# Patient Record
Sex: FEMALE | Race: WHITE | NOT HISPANIC OR LATINO | URBAN - METROPOLITAN AREA
[De-identification: names, ages, dates, MRNs, and addresses within clinical notes are randomized per-mention and may not be internally consistent; named-entity substitution may affect disease eponyms.]

---

## 2024-04-15 ENCOUNTER — OFFICE VISIT (OUTPATIENT)
Dept: FAMILY MEDICINE CLINIC | Facility: CLINIC | Age: 29
End: 2024-04-15
Payer: COMMERCIAL

## 2024-04-15 VITALS
BODY MASS INDEX: 26.61 KG/M2 | DIASTOLIC BLOOD PRESSURE: 72 MMHG | RESPIRATION RATE: 16 BRPM | OXYGEN SATURATION: 99 % | HEART RATE: 87 BPM | WEIGHT: 165.6 LBS | SYSTOLIC BLOOD PRESSURE: 118 MMHG | HEIGHT: 66 IN | TEMPERATURE: 97.7 F

## 2024-04-15 DIAGNOSIS — Z80.3 FAMILY HISTORY OF BREAST CANCER: ICD-10-CM

## 2024-04-15 DIAGNOSIS — N60.01 BENIGN BREAST CYST IN FEMALE, RIGHT: ICD-10-CM

## 2024-04-15 DIAGNOSIS — Z13.228 SCREENING FOR ENDOCRINE, METABOLIC AND IMMUNITY DISORDER: ICD-10-CM

## 2024-04-15 DIAGNOSIS — Z13.1 SCREENING FOR DIABETES MELLITUS: ICD-10-CM

## 2024-04-15 DIAGNOSIS — Z13.29 SCREENING FOR ENDOCRINE, METABOLIC AND IMMUNITY DISORDER: ICD-10-CM

## 2024-04-15 DIAGNOSIS — Z13.6 SCREENING FOR CARDIOVASCULAR CONDITION: Primary | ICD-10-CM

## 2024-04-15 DIAGNOSIS — R92.30 DENSE BREAST TISSUE ON MAMMOGRAM, UNSPECIFIED TYPE: ICD-10-CM

## 2024-04-15 DIAGNOSIS — Z13.0 SCREENING FOR ENDOCRINE, METABOLIC AND IMMUNITY DISORDER: ICD-10-CM

## 2024-04-15 DIAGNOSIS — F41.9 ANXIETY: ICD-10-CM

## 2024-04-15 PROCEDURE — 99204 OFFICE O/P NEW MOD 45 MIN: CPT | Performed by: FAMILY MEDICINE

## 2024-04-15 NOTE — PROGRESS NOTES
Assessment/Plan:  Problem List Items Addressed This Visit        Behavioral Health    Anxiety     The patient is doing more behavioral things to help control her anxiety such as exercise.  She will continue to monitor at this point.  She will consider possibly establishing with a therapist at some point.            Oncology    Family history of breast cancer    Relevant Orders    Ambulatory Referral to Oncology Genetics    Ambulatory Referral to Obstetrics / Gynecology       Obstetrics/Gynecology    Benign breast cyst in female, right    Relevant Orders    Ambulatory Referral to Oncology Genetics    Ambulatory Referral to Obstetrics / Gynecology    Dense breast tissue on mammogram    Relevant Orders    Ambulatory Referral to Oncology Genetics    Ambulatory Referral to Obstetrics / Gynecology   Other Visit Diagnoses     Screening for cardiovascular condition    -  Primary    Relevant Orders    CBC and differential    Comprehensive metabolic panel    LDL cholesterol, direct    Lipid panel    TSH, 3rd generation with Free T4 reflex    Screening for diabetes mellitus        Relevant Orders    CBC and differential    Comprehensive metabolic panel    LDL cholesterol, direct    Lipid panel    TSH, 3rd generation with Free T4 reflex    Screening for endocrine, metabolic and immunity disorder        Relevant Orders    CBC and differential    Comprehensive metabolic panel    LDL cholesterol, direct    Lipid panel    TSH, 3rd generation with Free T4 reflex    UA w Reflex to Microscopic w Reflex to Culture      The patient has a very strong family history of breast cancer in a first-degree relative.  We will refer her for oncological genetic counseling as indicated to further assess her risk and will also help establish her with OB/GYN for routine gynecologic care.  She will go for the up-to-date lab work as ordered.  She will continue with her healthy diet and exercise.  We will see her back as scheduled.      Return in about  6 months (around 10/15/2024) for Annual physical.   I spent 15 minutes during the visit reviewing the history from the patient, performing the examination, discussing the findings with the patient, providing counseling and education, and making a plan. I spent 15 minutes ordering referrals and testing and documenting.    Subjective:   Chief Complaint   Patient presents with   • Establish Care     Complete Physical        Patient ID: Sandra Van is a 29 y.o. female presents today for an evaluation to establish care.  Sandra Van is a 29 y.o. female who presents today for new patient evaluation to establish care.  She is feeling well.  The patient denies any chest pain, shortness of breath, or palpitations.  There is no edema.  There are no headaches or visual changes.  There is no lightheadedness, dizziness, or fainting spells.  The patient currently denies any nausea, vomiting, or GERD symptoms.  she has normal bowel movements and normal urine output.  she has a normal appetite.  She needs a new GYN.   She has had a right breast cyst- She had US- at [facility].  She was told to have US every 6 months.  She is - she is looking to start a family.    She is concerned about her there is strong family history of breast cancer in first-degree relative.  Her mom  of breast cancer at age 51 that was very aggressive and metastatic.  She was diagnosed at age 45.          The following portions of the patient's history were reviewed and updated as appropriate: allergies, current medications, past family history, past medical history, past social history, past surgical history and problem list.  Patient Active Problem List   Diagnosis   • Family history of breast cancer   • Anxiety   • Dense breast tissue on mammogram   • Benign breast cyst in female, right       Past Medical History:   Diagnosis Date   • COVID-19      History reviewed. No pertinent surgical history.  Allergies   Allergen Reactions   •  Albuterol Hives, Palpitations, Shortness Of Breath and Tachycardia     Category: Adverse Reaction;   • Metoclopramide Hives, Hyperactivity and Itching     Throat started closing     Family History   Problem Relation Age of Onset   • Breast cancer Mother 45        Metastatic Disease-  at age 51.   • Hypertrophic cardiomyopathy Father    • Prostate cancer Father      Social History     Socioeconomic History   • Marital status: /Civil Union     Spouse name: Not on file   • Number of children: Not on file   • Years of education: Not on file   • Highest education level: Not on file   Occupational History   • Not on file   Tobacco Use   • Smoking status: Never   • Smokeless tobacco: Never   Vaping Use   • Vaping status: Never Used   Substance and Sexual Activity   • Alcohol use: Not Currently   • Drug use: Not Currently   • Sexual activity: Not on file   Other Topics Concern   • Not on file   Social History Narrative   • Not on file     Social Determinants of Health     Financial Resource Strain: Not on file   Food Insecurity: Not on file   Transportation Needs: Not on file   Physical Activity: Not on file   Stress: Not on file   Social Connections: Not on file   Intimate Partner Violence: Not on file   Housing Stability: Not on file     No current outpatient medications on file prior to visit.     No current facility-administered medications on file prior to visit.     Review of Systems   Constitutional: Negative.    HENT: Negative.     Eyes: Negative.    Respiratory: Negative.     Cardiovascular: Negative.    Gastrointestinal: Negative.    Endocrine: Negative.    Genitourinary: Negative.    Musculoskeletal: Negative.    Skin: Negative.    Allergic/Immunologic: Negative.    Neurological: Negative.    Hematological: Negative.    Psychiatric/Behavioral: Negative.         Objective:  Vitals:    04/15/24 0848   BP: 118/72   BP Location: Left arm   Patient Position: Sitting   Cuff Size: Large   Pulse: 87   Resp: 16  "  Temp: 97.7 °F (36.5 °C)   TempSrc: Tympanic   SpO2: 99%   Weight: 75.1 kg (165 lb 9.6 oz)   Height: 5' 5.6\" (1.666 m)     Body mass index is 27.06 kg/m².     Physical Exam  Constitutional:       Appearance: She is well-developed.   HENT:      Head: Normocephalic and atraumatic.      Right Ear: Tympanic membrane, ear canal and external ear normal.      Left Ear: Tympanic membrane, ear canal and external ear normal.      Nose: Nose normal.      Mouth/Throat:      Mouth: Mucous membranes are moist.      Pharynx: No oropharyngeal exudate.   Eyes:      Extraocular Movements: Extraocular movements intact.      Conjunctiva/sclera: Conjunctivae normal.      Pupils: Pupils are equal, round, and reactive to light.   Neck:      Thyroid: No thyromegaly.      Vascular: No JVD.      Trachea: No tracheal deviation.   Cardiovascular:      Rate and Rhythm: Normal rate and regular rhythm.      Pulses: Normal pulses.      Heart sounds: Normal heart sounds. No murmur heard.     No friction rub. No gallop.   Pulmonary:      Effort: Pulmonary effort is normal. No respiratory distress.      Breath sounds: Normal breath sounds. No stridor. No wheezing or rales.   Chest:      Chest wall: No tenderness.   Abdominal:      General: Bowel sounds are normal. There is no distension.      Palpations: Abdomen is soft. There is no mass.      Tenderness: There is no abdominal tenderness. There is no guarding or rebound.   Musculoskeletal:         General: No swelling, tenderness, deformity or signs of injury. Normal range of motion.      Cervical back: Normal range of motion.      Right lower leg: No edema.      Left lower leg: No edema.   Lymphadenopathy:      Cervical: No cervical adenopathy.   Skin:     General: Skin is warm and dry.   Neurological:      General: No focal deficit present.      Mental Status: She is alert and oriented to person, place, and time.      Cranial Nerves: No cranial nerve deficit.      Motor: No abnormal muscle tone. "      Coordination: Coordination normal.      Deep Tendon Reflexes: Reflexes are normal and symmetric. Reflexes normal.             Depression Screening and Follow-up Plan: Patient was screened for depression during today's encounter. They screened negative with a PHQ-2 score of 1.

## 2024-04-15 NOTE — ASSESSMENT & PLAN NOTE
The patient is doing more behavioral things to help control her anxiety such as exercise.  She will continue to monitor at this point.  She will consider possibly establishing with a therapist at some point.

## 2024-04-16 ENCOUNTER — TELEPHONE (OUTPATIENT)
Dept: HEMATOLOGY ONCOLOGY | Facility: CLINIC | Age: 29
End: 2024-04-16

## 2024-04-16 NOTE — TELEPHONE ENCOUNTER
I called Sandra in response to a referral that was received for patient to establish care with [facility].     Outreach was made to schedule a consultation.    I left a voicemail explaining the reason for my call and advised patient to call hospitals at [phone].  The referral has been closed.

## 2024-04-16 NOTE — TELEPHONE ENCOUNTER
I spoke with Sandra to schedule their consultation with [facility].     Scheduling Outcome: Patient is scheduled for an appointment on 11/21/24 at  with [facility]    Personal/Family History Related to Appointment:     Personal History of Cancer: Patient reports no personal history of cancer    Family History of Cancer: Patient reports family history of mom- breast    Is patient of [employer]?: No    History of Genetic Testing:  Personal History of Genetic Testing: Patient report no personal history of Genetic Testing.    Family History of Genetic Testing: Patient reports that no family members have had Genetic Testing.     Patient's preferred e-mail address: [email or web address]

## 2024-04-19 LAB
ALBUMIN SERPL-MCNC: 4.5 G/DL (ref 3.6–5.1)
ALBUMIN/GLOB SERPL: 1.5 (CALC) (ref 1–2.5)
ALP SERPL-CCNC: 60 U/L (ref 31–125)
ALT SERPL-CCNC: 14 U/L (ref 6–29)
APPEARANCE UR: CLEAR
AST SERPL-CCNC: 13 U/L (ref 10–30)
BACTERIA UR QL AUTO: NORMAL /HPF
BASOPHILS # BLD AUTO: 30 CELLS/UL (ref 0–200)
BASOPHILS NFR BLD AUTO: 0.6 %
BILIRUB SERPL-MCNC: 0.5 MG/DL (ref 0.2–1.2)
BILIRUB UR QL STRIP: NEGATIVE
BUN SERPL-MCNC: 12 MG/DL (ref 7–25)
BUN/CREAT SERPL: NORMAL (CALC) (ref 6–22)
CALCIUM SERPL-MCNC: 9.4 MG/DL (ref 8.6–10.2)
CHLORIDE SERPL-SCNC: 104 MMOL/L (ref 98–110)
CHOLEST SERPL-MCNC: 201 MG/DL
CHOLEST/HDLC SERPL: 3.5 (CALC)
CO2 SERPL-SCNC: 26 MMOL/L (ref 20–32)
COLOR UR: YELLOW
CREAT SERPL-MCNC: 0.64 MG/DL (ref 0.5–0.97)
EOSINOPHIL # BLD AUTO: 60 CELLS/UL (ref 15–500)
EOSINOPHIL NFR BLD AUTO: 1.2 %
ERYTHROCYTE [DISTWIDTH] IN BLOOD BY AUTOMATED COUNT: 11.8 % (ref 11–15)
GFR/BSA.PRED SERPLBLD CYS-BASED-ARV: 121 ML/MIN/1.73M2
GLOBULIN SER CALC-MCNC: 3.1 G/DL (CALC) (ref 1.9–3.7)
GLUCOSE SERPL-MCNC: 93 MG/DL (ref 65–99)
GLUCOSE UR QL STRIP: NEGATIVE
HCT VFR BLD AUTO: 37.3 % (ref 35–45)
HDLC SERPL-MCNC: 57 MG/DL
HGB BLD-MCNC: 12.5 G/DL (ref 11.7–15.5)
HGB UR QL STRIP: NEGATIVE
HYALINE CASTS #/AREA URNS LPF: NORMAL /LPF
KETONES UR QL STRIP: NEGATIVE
LDLC SERPL CALC-MCNC: 128 MG/DL (CALC)
LDLC SERPL DIRECT ASSAY-MCNC: 126 MG/DL
LEUKOCYTE ESTERASE UR QL STRIP: NEGATIVE
LYMPHOCYTES # BLD AUTO: 1365 CELLS/UL (ref 850–3900)
LYMPHOCYTES NFR BLD AUTO: 27.3 %
MCH RBC QN AUTO: 28.7 PG (ref 27–33)
MCHC RBC AUTO-ENTMCNC: 33.5 G/DL (ref 32–36)
MCV RBC AUTO: 85.7 FL (ref 80–100)
MONOCYTES # BLD AUTO: 290 CELLS/UL (ref 200–950)
MONOCYTES NFR BLD AUTO: 5.8 %
NEUTROPHILS # BLD AUTO: 3255 CELLS/UL (ref 1500–7800)
NEUTROPHILS NFR BLD AUTO: 65.1 %
NITRITE UR QL STRIP: NEGATIVE
NONHDLC SERPL-MCNC: 144 MG/DL (CALC)
PH UR STRIP: 7.5 [PH] (ref 5–8)
PLATELET # BLD AUTO: 303 THOUSAND/UL (ref 140–400)
PMV BLD REES-ECKER: 11.3 FL (ref 7.5–12.5)
POTASSIUM SERPL-SCNC: 4 MMOL/L (ref 3.5–5.3)
PROT SERPL-MCNC: 7.6 G/DL (ref 6.1–8.1)
PROT UR QL STRIP: NEGATIVE
RBC # BLD AUTO: 4.35 MILLION/UL (ref 3.8–5.1)
RBC #/AREA URNS HPF: NORMAL /HPF
SODIUM SERPL-SCNC: 138 MMOL/L (ref 135–146)
SP GR UR STRIP: 1 (ref 1–1.03)
SQUAMOUS #/AREA URNS HPF: NORMAL /HPF
TRIGL SERPL-MCNC: 72 MG/DL
TSH SERPL-ACNC: 1.72 MIU/L
WBC # BLD AUTO: 5 THOUSAND/UL (ref 3.8–10.8)
WBC #/AREA URNS HPF: NORMAL /HPF

## 2024-06-04 ENCOUNTER — OFFICE VISIT (OUTPATIENT)
Dept: OBGYN CLINIC | Facility: CLINIC | Age: 29
End: 2024-06-04
Payer: COMMERCIAL

## 2024-06-04 VITALS
HEIGHT: 65 IN | SYSTOLIC BLOOD PRESSURE: 118 MMHG | WEIGHT: 164 LBS | BODY MASS INDEX: 27.32 KG/M2 | DIASTOLIC BLOOD PRESSURE: 60 MMHG

## 2024-06-04 DIAGNOSIS — Z01.419 WELL WOMAN EXAM: Primary | ICD-10-CM

## 2024-06-04 DIAGNOSIS — N60.01 BENIGN BREAST CYST IN FEMALE, RIGHT: ICD-10-CM

## 2024-06-04 PROCEDURE — S0610 ANNUAL GYNECOLOGICAL EXAMINA: HCPCS | Performed by: OBSTETRICS & GYNECOLOGY

## 2024-06-04 NOTE — PROGRESS NOTES
[facility] - [place]  [address] [place] [address]    ASSESSMENT/PLAN: Sandra Van is a 29 y.o. No obstetric history on file. who presents for annual gynecologic exam.    Encounter for routine gynecologic examination  - Routine well woman exam completed today.  - Cervical Cancer Screening: Current ASCCP Guidelines reviewed. Last Pap: Not on file . History of abnormal: denies  - HPV Vaccination status: Immunization series complete  - STI screening offered including HIV testing: offered, pt declined  - Contraceptive counseling discussed.  Current contraception: no method, :   - The following were reviewed in today's visit: breast self exam, adequate intake of calcium and vitamin D, exercise, and healthy diet  - Has appointment with genetic counselor this year. Will await recommendations before getting further imaging.     Additional problems addressed during this visit:  1. Dense breast tissue on mammogram, unspecified type  -     Ambulatory Referral to Obstetrics / Gynecology  2. Family history of breast cancer  -     Ambulatory Referral to Obstetrics / Gynecology  3. Benign breast cyst in female, right  -     Ambulatory Referral to Obstetrics / Gynecology      CC:  Annual Gynecologic Examination    HPI: Sandra Van is a 29 y.o. No obstetric history on file. who presents for annual gynecologic examination. She reports a monthly menses. She was interested in pregnancy however was concerned because of imaging needed for breast cyst. Her mother  of breast cancer at age 45 and previous radiologist rec breast imaging q6mo per patient. She reports this worsened her anxiety. Most recent one is from  which shows stable 5mm right cyst. Otherwise, she reports no complaints.     ROS: Negative except as noted in HPI    Patient's last menstrual period was 2024 (exact date).       She  reports being sexually active and has had partner(s) who are male. She reports using the following  method of birth control/protection: Condom Male.       The following portions of the patient's history were reviewed and updated as appropriate:   Past Medical History:   Diagnosis Date    COVID-19      No past surgical history on file.  Family History   Problem Relation Age of Onset    Breast cancer Mother 45        Passed away from breast cancer    Hypertrophic cardiomyopathy Father     Prostate cancer Father     Heart disease Father         Diagnosed with HCM    Stroke Maternal Grandmother     Stroke Maternal Grandfather     Colon cancer Paternal Grandmother         Passed away from colon cancer    Anuerysm Paternal Grandfather         aortic     Social History     Socioeconomic History    Marital status: /Civil Union     Spouse name: None    Number of children: None    Years of education: None    Highest education level: None   Occupational History    None   Tobacco Use    Smoking status: Never    Smokeless tobacco: Never   Vaping Use    Vaping status: Never Used   Substance and Sexual Activity    Alcohol use: Not Currently    Drug use: Never    Sexual activity: Yes     Partners: Male     Birth control/protection: Condom Male     Comment: no new partner in past year   Other Topics Concern    None   Social History Narrative    None     Social Determinants of Health     Financial Resource Strain: Not on file   Food Insecurity: Not on file   Transportation Needs: Not on file   Physical Activity: Not on file   Stress: Not on file   Social Connections: Not on file   Intimate Partner Violence: Not on file   Housing Stability: Not on file     No outpatient medications have been marked as taking for the 6/4/24 encounter (Office Visit) with Carolina CARY DO.     Allergies   Allergen Reactions    Albuterol Hives, Palpitations, Shortness Of Breath and Tachycardia     Category: Adverse Reaction;    Metoclopramide Hives, Hyperactivity and Itching     Throat started closing           Objective:  /60 (BP  "Location: Left arm, Patient Position: Sitting, Cuff Size: Large)   Ht 5' 5\" (1.651 m)   Wt 74.4 kg (164 lb)   LMP 05/12/2024 (Exact Date)   BMI 27.29 kg/m²        Chaperone present? Pt declined    General Appearance: alert and oriented, in no acute distress.   Respiratory: Unlabored breathing.  Abdomen: Soft, non-tender, non-distended, no masses, no rebound or guarding.  Breast Exam: No dimpling, nipple retraction or discharge. No lumps or masses. No axillary or supraclavicular nodes.   Pelvic:   External genitalia: Normal appearance, no abnormal pigmentation, no lesions or masses. Normal Bartholin's and [place]'s.      Urinary system: Urethral meatus normal,       Bladder non-tender.      Vaginal: normal mucosa without prolapse or lesions. Normal-appearing physiologic discharge.      Cervix: Normal-appearing, well-epithelialized, no gross lesions or masses. No cervical motion tenderness.      Adnexa: No adnexal masses or tenderness noted.      Uterus: Normal-sized, regular contour, midline, mobile, no uterine tenderness.  Extremities: Normal range of motion. Warm, well-perfused, non-tender.   Skin: normal, no rash or abnormalities  Neurologic: alert, oriented x3  Psychiatric: Appropriate affect, mood stable, cooperative with exam.        Carolina Walker DO  6/4/2024 8:15 AM   "